# Patient Record
Sex: MALE | Race: WHITE | Employment: FULL TIME | URBAN - METROPOLITAN AREA
[De-identification: names, ages, dates, MRNs, and addresses within clinical notes are randomized per-mention and may not be internally consistent; named-entity substitution may affect disease eponyms.]

---

## 2017-06-09 PROCEDURE — 87340 HEPATITIS B SURFACE AG IA: CPT | Performed by: INTERNAL MEDICINE

## 2017-06-09 PROCEDURE — 86735 MUMPS ANTIBODY: CPT | Performed by: INTERNAL MEDICINE

## 2017-06-09 PROCEDURE — 86787 VARICELLA-ZOSTER ANTIBODY: CPT | Performed by: INTERNAL MEDICINE

## 2017-06-09 PROCEDURE — 86765 RUBEOLA ANTIBODY: CPT | Performed by: INTERNAL MEDICINE

## 2017-06-09 PROCEDURE — 86762 RUBELLA ANTIBODY: CPT | Performed by: INTERNAL MEDICINE

## 2018-09-04 ENCOUNTER — HOSPITAL ENCOUNTER (OUTPATIENT)
Age: 53
Discharge: HOME | End: 2018-09-04
Attending: FAMILY MEDICINE
Payer: COMMERCIAL

## 2018-09-04 VITALS
TEMPERATURE: 98 F | RESPIRATION RATE: 16 BRPM | DIASTOLIC BLOOD PRESSURE: 81 MMHG | OXYGEN SATURATION: 97 % | SYSTOLIC BLOOD PRESSURE: 122 MMHG | BODY MASS INDEX: 27 KG/M2 | WEIGHT: 200 LBS | HEART RATE: 70 BPM

## 2018-09-04 DIAGNOSIS — J32.9 SINOBRONCHITIS: Primary | ICD-10-CM

## 2018-09-04 DIAGNOSIS — J40 SINOBRONCHITIS: Primary | ICD-10-CM

## 2018-09-04 PROCEDURE — 99214 OFFICE O/P EST MOD 30 MIN: CPT

## 2018-09-04 PROCEDURE — 99213 OFFICE O/P EST LOW 20 MIN: CPT

## 2018-09-04 RX ORDER — BENZONATATE 200 MG/1
200 CAPSULE ORAL 3 TIMES DAILY PRN
Qty: 15 CAPSULE | Refills: 0 | Status: SHIPPED | OUTPATIENT
Start: 2018-09-04 | End: 2021-03-15

## 2018-09-04 RX ORDER — AMOXICILLIN AND CLAVULANATE POTASSIUM 875; 125 MG/1; MG/1
1 TABLET, FILM COATED ORAL 2 TIMES DAILY
Qty: 20 TABLET | Refills: 0 | Status: SHIPPED | OUTPATIENT
Start: 2018-09-04 | End: 2018-09-14

## 2018-09-04 RX ORDER — PREDNISONE 20 MG/1
40 TABLET ORAL DAILY
Qty: 10 TABLET | Refills: 0 | Status: SHIPPED | OUTPATIENT
Start: 2018-09-04 | End: 2018-09-09

## 2018-09-04 NOTE — ED PROVIDER NOTES
Patient presents with:  Sinus Problem  Cough/URI    HPI:     Annie Joseph is a 47year old male who presents with a chief complaint of sinus pain/pressure, facial pain, frontal headache, nasal congestion, thick colored nasal drainage, loss of smell, post edematous, inflamed, moderate maxillary sinus tenderness bilateral, mild frontal sinus tenderness bilateral  Throat: lips, mucosa, and tongue normal; teeth and gums normal  Neck: no adenopathy and supple, symmetrical, trachea midline  Back: symmetric, no c [id number]  [phone]    In 1 week  For re-check

## 2020-03-05 ENCOUNTER — HOSPITAL ENCOUNTER (OUTPATIENT)
Age: 55
Discharge: HOME | End: 2020-03-05
Payer: COMMERCIAL

## 2020-03-05 VITALS
HEART RATE: 75 BPM | SYSTOLIC BLOOD PRESSURE: 140 MMHG | DIASTOLIC BLOOD PRESSURE: 84 MMHG | RESPIRATION RATE: 16 BRPM | TEMPERATURE: 97 F | OXYGEN SATURATION: 96 %

## 2020-03-05 DIAGNOSIS — J06.9 VIRAL UPPER RESPIRATORY ILLNESS: Primary | ICD-10-CM

## 2020-03-05 LAB
POCT INFLUENZA A: NEGATIVE
POCT INFLUENZA B: NEGATIVE

## 2020-03-05 PROCEDURE — 87502 INFLUENZA DNA AMP PROBE: CPT

## 2020-03-05 PROCEDURE — 99213 OFFICE O/P EST LOW 20 MIN: CPT

## 2020-03-05 PROCEDURE — 99214 OFFICE O/P EST MOD 30 MIN: CPT

## 2020-03-05 RX ORDER — PREDNISONE 20 MG/1
40 TABLET ORAL DAILY
Qty: 10 TABLET | Refills: 0 | Status: SHIPPED | OUTPATIENT
Start: 2020-03-05 | End: 2020-03-10

## 2020-03-05 RX ORDER — ALBUTEROL SULFATE 90 UG/1
2 AEROSOL, METERED RESPIRATORY (INHALATION) EVERY 4 HOURS PRN
Qty: 1 INHALER | Refills: 0 | Status: SHIPPED | OUTPATIENT
Start: 2020-03-05 | End: 2020-04-04

## 2020-03-05 RX ORDER — CODEINE PHOSPHATE AND GUAIFENESIN 10; 100 MG/5ML; MG/5ML
5 SOLUTION ORAL EVERY 6 HOURS PRN
Qty: 118 ML | Refills: 0 | Status: SHIPPED | OUTPATIENT
Start: 2020-03-05 | End: 2021-03-15

## 2020-03-05 NOTE — ED PROVIDER NOTES
Patient Seen in: [address] [facility] [place]      History   Patient presents with:  Cough/URI    Stated Complaint: Cough, Faitgue, Eye Pain (Exposure to Influenza)    HPI    Patient is a 50-year-old male.   Within the past 48 hours he has developed in No injection noted to the bilateral auditory canals; no loss of landmarks. Normal nasal mucosa without audible nasal congestion. Oropharynx is patent without evidence of erythema, exudates or deviation.   No stridor to auscultation  Lung: No distress, RR,

## 2020-03-05 NOTE — ED INITIAL ASSESSMENT (HPI)
Pt with c/o cough that started on Tuesday. Pt with c/o fatigue, eye pain.   Pt was recently exposed to flu-a

## 2021-03-15 PROBLEM — J45.909 ASTHMA: Status: ACTIVE | Noted: 2021-03-15

## 2021-03-15 PROBLEM — J45.909 ASTHMA (HCC): Status: ACTIVE | Noted: 2021-03-15

## 2021-04-14 PROCEDURE — 88305 TISSUE EXAM BY PATHOLOGIST: CPT | Performed by: INTERNAL MEDICINE

## 2022-05-12 ENCOUNTER — HOSPITAL ENCOUNTER (OUTPATIENT)
Age: 57
Discharge: HOME | End: 2022-05-12
Payer: COMMERCIAL

## 2022-05-12 VITALS
RESPIRATION RATE: 18 BRPM | SYSTOLIC BLOOD PRESSURE: 122 MMHG | OXYGEN SATURATION: 96 % | HEART RATE: 80 BPM | DIASTOLIC BLOOD PRESSURE: 82 MMHG | TEMPERATURE: 98 F | HEIGHT: 72 IN | BODY MASS INDEX: 30.4 KG/M2 | WEIGHT: 224.44 LBS

## 2022-05-12 DIAGNOSIS — J02.0 STREP PHARYNGITIS: ICD-10-CM

## 2022-05-12 DIAGNOSIS — Z11.52 ENCOUNTER FOR SCREENING FOR COVID-19: Primary | ICD-10-CM

## 2022-05-12 DIAGNOSIS — U07.1 COVID-19 VIRUS INFECTION: ICD-10-CM

## 2022-05-12 DIAGNOSIS — R05.9 COUGH: ICD-10-CM

## 2022-05-12 LAB
POCT INFLUENZA A: NEGATIVE
POCT INFLUENZA B: NEGATIVE
S PYO AG THROAT QL: POSITIVE
SARS-COV-2 RNA RESP QL NAA+PROBE: DETECTED

## 2022-05-12 PROCEDURE — U0002 COVID-19 LAB TEST NON-CDC: HCPCS | Performed by: NURSE PRACTITIONER

## 2022-05-12 PROCEDURE — 87880 STREP A ASSAY W/OPTIC: CPT | Performed by: NURSE PRACTITIONER

## 2022-05-12 PROCEDURE — M0222 INTRAVENOUS INJECTION, BEBTELOVIMAB, INCLUDES INJECTION AND POST ADMINISTRATIVE MONITORING: HCPCS | Performed by: NURSE PRACTITIONER

## 2022-05-12 PROCEDURE — 99203 OFFICE O/P NEW LOW 30 MIN: CPT | Performed by: NURSE PRACTITIONER

## 2022-05-12 PROCEDURE — 87502 INFLUENZA DNA AMP PROBE: CPT | Performed by: NURSE PRACTITIONER

## 2022-05-12 RX ORDER — PENICILLIN V POTASSIUM 500 MG/1
500 TABLET ORAL 2 TIMES DAILY
Qty: 20 TABLET | Refills: 0 | Status: SHIPPED | OUTPATIENT
Start: 2022-05-12 | End: 2022-05-22

## 2022-05-12 RX ORDER — BEBTELOVIMAB 87.5 MG/ML
175 INJECTION, SOLUTION INTRAVENOUS ONCE
Status: COMPLETED | OUTPATIENT
Start: 2022-05-12 | End: 2022-05-12

## 2022-05-12 RX ORDER — BENZONATATE 100 MG/1
100 CAPSULE ORAL 3 TIMES DAILY PRN
Qty: 30 CAPSULE | Refills: 0 | Status: SHIPPED | OUTPATIENT
Start: 2022-05-12 | End: 2022-06-11

## 2022-05-12 RX ORDER — FLUTICASONE PROPIONATE 50 MCG
2 SPRAY, SUSPENSION (ML) NASAL DAILY
Qty: 16 G | Refills: 0 | Status: SHIPPED | OUTPATIENT
Start: 2022-05-12 | End: 2022-06-11

## 2023-12-19 ENCOUNTER — HOSPITAL ENCOUNTER (OUTPATIENT)
Age: 58
Discharge: HOME | End: 2023-12-19
Payer: COMMERCIAL

## 2023-12-19 VITALS
HEIGHT: 72 IN | WEIGHT: 225 LBS | HEART RATE: 80 BPM | BODY MASS INDEX: 30.48 KG/M2 | RESPIRATION RATE: 16 BRPM | SYSTOLIC BLOOD PRESSURE: 147 MMHG | OXYGEN SATURATION: 96 % | DIASTOLIC BLOOD PRESSURE: 92 MMHG | TEMPERATURE: 98 F

## 2023-12-19 DIAGNOSIS — J06.9 VIRAL UPPER RESPIRATORY ILLNESS: ICD-10-CM

## 2023-12-19 DIAGNOSIS — U07.1 COVID-19: Primary | ICD-10-CM

## 2023-12-19 LAB
POCT INFLUENZA A: NEGATIVE
POCT INFLUENZA B: NEGATIVE
SARS-COV-2 RNA RESP QL NAA+PROBE: DETECTED

## 2023-12-19 PROCEDURE — 99204 OFFICE O/P NEW MOD 45 MIN: CPT | Performed by: PHYSICIAN ASSISTANT

## 2023-12-19 PROCEDURE — U0002 COVID-19 LAB TEST NON-CDC: HCPCS | Performed by: PHYSICIAN ASSISTANT

## 2023-12-19 PROCEDURE — 87502 INFLUENZA DNA AMP PROBE: CPT | Performed by: PHYSICIAN ASSISTANT

## 2023-12-19 RX ORDER — METHYLPREDNISOLONE 4 MG/1
TABLET ORAL
Qty: 1 EACH | Refills: 0 | Status: SHIPPED | OUTPATIENT
Start: 2023-12-19

## 2023-12-19 NOTE — DISCHARGE INSTRUCTIONS
Continue to quarantine for an additional few days. Purchase Sudafed directly from the pharmacist and take as directed. Flonase.   Vitamin C and zinc.  Push clear fluids

## 2024-03-21 ENCOUNTER — APPOINTMENT (OUTPATIENT)
Dept: GENERAL RADIOLOGY | Age: 59
End: 2024-03-21
Attending: NURSE PRACTITIONER
Payer: COMMERCIAL

## 2024-03-21 ENCOUNTER — HOSPITAL ENCOUNTER (OUTPATIENT)
Age: 59
Discharge: HOME | End: 2024-03-21
Payer: COMMERCIAL

## 2024-03-21 VITALS
RESPIRATION RATE: 18 BRPM | TEMPERATURE: 98 F | DIASTOLIC BLOOD PRESSURE: 75 MMHG | SYSTOLIC BLOOD PRESSURE: 134 MMHG | HEART RATE: 79 BPM | OXYGEN SATURATION: 97 %

## 2024-03-21 DIAGNOSIS — J34.89 STUFFY AND RUNNY NOSE: ICD-10-CM

## 2024-03-21 DIAGNOSIS — J34.89 SINUS PRESSURE: ICD-10-CM

## 2024-03-21 DIAGNOSIS — J02.9 SORE THROAT: ICD-10-CM

## 2024-03-21 DIAGNOSIS — R05.1 ACUTE COUGH: Primary | ICD-10-CM

## 2024-03-21 LAB — S PYO AG THROAT QL: NEGATIVE

## 2024-03-21 PROCEDURE — 71046 X-RAY EXAM CHEST 2 VIEWS: CPT | Performed by: NURSE PRACTITIONER

## 2024-03-21 RX ORDER — PREDNISONE 20 MG/1
40 TABLET ORAL DAILY
Qty: 10 TABLET | Refills: 0 | Status: SHIPPED | OUTPATIENT
Start: 2024-03-21 | End: 2024-03-26

## 2024-03-21 NOTE — DISCHARGE INSTRUCTIONS
Humidifier in same room.  Hot steam showers/steam inhalations.  Sinus rinses such as the Mariel pot.  Continue taking your Claritin and Flonase.  Keep well-hydrated.  Over-the-counter Motrin and/or Tylenol as needed.  Take the prednisone as prescribed.  Follow-up with your doctor.  Return or go to the ER for new or worsening symptoms.

## 2024-03-21 NOTE — ED INITIAL ASSESSMENT (HPI)
Patient c/o facial pain/pressure, sore throat and cough for 4 days. Was + for Covid in December.

## 2024-03-21 NOTE — ED PROVIDER NOTES
Patient Seen in: [facility]      History     Chief Complaint   Patient presents with    Cough    Sinusitis    Sore Throat     Stated Complaint: sinus issues, sore throat, cough    Subjective:   58-year-old male, with 3 days of stuffy and runny nose, sinus pressure and pain, sore throat, cough.  Concerned because he thinks that the cough is getting worse.  Has a wife who who is sick with similar symptoms.  States that his wife was given a Z-Patircio.  Patient does take Flonase and an antihistamine.  Also takes albuterol inhaler as needed.  History of extrinsic asthma.            Objective:   Past Medical History:   Diagnosis Date    Extrinsic asthma, unspecified     asthma flares with illnesses, not typically on inhaler              Past Surgical History:   Procedure Laterality Date    HIP REPLACEMENT SURGERY                  Social History     Socioeconomic History    Marital status:    Tobacco Use    Smoking status: Former     Packs/day: 0.50     Years: 20.00     Additional pack years: 0.00     Total pack years: 10.00     Types: Cigarettes     Quit date: 2016     Years since quittin.2    Smokeless tobacco: Never   Vaping Use    Vaping Use: Some days   Substance and Sexual Activity    Alcohol use: Yes     Alcohol/week: 0.0 standard drinks of alcohol     Comment: Former alcoholic    Drug use: Yes     Types: Cannabis     Comment: occ              Review of Systems   Constitutional: Negative.    HENT:  Positive for congestion, rhinorrhea, sinus pressure and sore throat.    Respiratory:  Positive for cough.    All other systems reviewed and are negative.      Positive for stated complaint: sinus issues, sore throat, cough  Other systems are as noted in HPI.  Constitutional and vital signs reviewed.      All other systems reviewed and negative except as noted above.    Physical Exam     ED Triage Vitals [24 1718]   /75   Pulse 79   Resp 18   Temp 98 °F (36.7 °C)   Temp src Temporal    SpO2 97 %   O2 Device None (Room air)       Current:/75   Pulse 79   Temp 98 °F (36.7 °C) (Temporal)   Resp 18   SpO2 97%         Physical Exam  Vitals and nursing note reviewed.   Constitutional:       General: He is not in acute distress.     Appearance: He is well-developed. He is not ill-appearing, toxic-appearing or diaphoretic.   HENT:      Head:      Jaw: No trismus.      Right Ear: Tympanic membrane, ear canal and external ear normal.      Left Ear: Tympanic membrane, ear canal and external ear normal.      Nose: Congestion present.      Right Sinus: No maxillary sinus tenderness or frontal sinus tenderness.      Left Sinus: No maxillary sinus tenderness or frontal sinus tenderness.      Mouth/Throat:      Lips: Pink. No lesions.      Mouth: Mucous membranes are moist. No oral lesions.      Tongue: No lesions.      Palate: No lesions.      Pharynx: Oropharynx is clear. Uvula midline. No pharyngeal swelling, oropharyngeal exudate, posterior oropharyngeal erythema or uvula swelling.      Tonsils: No tonsillar exudate or tonsillar abscesses.   Cardiovascular:      Rate and Rhythm: Normal rate and regular rhythm.      Pulses: Normal pulses.      Heart sounds: Normal heart sounds.   Pulmonary:      Effort: Pulmonary effort is normal. No respiratory distress.      Breath sounds: Normal breath sounds. No stridor. No wheezing, rhonchi or rales.   Musculoskeletal:      Cervical back: Normal range of motion and neck supple.   Skin:     General: Skin is warm and dry.      Coloration: Skin is not pale.      Findings: No rash.   Neurological:      General: No focal deficit present.      Mental Status: He is alert.   Psychiatric:         Mood and Affect: Mood normal.               ED Course     Labs Reviewed   POCT RAPID STREP - Normal     XR CHEST PA + LAT CHEST (CPT=71046)    Result Date: 3/21/2024  PROCEDURE:  XR CHEST PA + LAT CHEST (CPT=71046)  INDICATIONS:  sinus issues, sore throat, cough  COMPARISON:   None.  TECHNIQUE:  PA and lateral chest radiographs were obtained.  PATIENT STATED HISTORY: (As transcribed by Technologist)  The patient has a cough and congestion.    FINDINGS:  Mild bronchial wall thickening.  No lobar consolidation is seen.  Cardiac silhouette is normal in size.  Pleural spaces are clear.            CONCLUSION:  Bronchial wall thickening may represent bronchitis or reactive airway disease.  LOCATION:  [facility]   Dictated by (CST): Stromberg, LeRoy, MD on 3/21/2024 at 5:48 PM     Finalized by (CST): Stromberg, LeRoy, MD on 3/21/2024 at 5:49 PM                       MDM                                         Medical Decision Making  Differential diagnosis initially included but was not limited to: Strep, pneumonia, other viral illness    Nontoxic 58-year-old male, with 3 to 4 days of sinus congestion, stuffy and runny nose, sore throat, cough.  States sore throat is mainly from coughing.  States he has a wife who is sick with similar symptoms, and his wife was started on Z-Patricio.  Given the duration of the symptoms, highly unlikely that this is a bacterial sinus infection.  More likely to be a viral.  However he was concerned with worsening cough, so I will obtain a x-ray of the chest to rule out pneumonia.  I will also obtain strep to rule out strep infection.There is no trismus, drooling, unilateral tonsillar tonsillar swelling, voice change/muffled voice, so I do not think this is epiglottitis/peritonsillar abscess.  There is no adventitious breath sounds.  No signs of increased work of breathing or respiratory distress.  Negative strep.I personally viewed, independently interpreted and radiology report was reviewed.  My personal interpretation is no pneumonia.  Likely viral.    Supportive/home management of diagnosis/illness/injury discussed. Red flag symptoms discussed.  Signs and symptoms/criteria that would necessitate reevaluation, including ER evaluation discussed.  Patient and/or  responsible adult verbalize and agree with management and plan of care.    Speech recognition software was used during this dictation.  There may be minor errors in transcription.      Amount and/or Complexity of Data Reviewed  Labs: ordered. Decision-making details documented in ED Course.  Radiology: ordered and independent interpretation performed. Decision-making details documented in ED Course.    Risk  OTC drugs.  Prescription drug management.        Disposition and Plan     Clinical Impression:  1. Acute cough    2. Stuffy and runny nose    3. Sinus pressure    4. Sore throat         Disposition:  Discharge  3/21/2024  5:54 pm    Follow-up:  Dm Schwartz MD  [address] [address]  [phone]    Call in 5 days            Medications Prescribed:  Current Discharge Medication List        START taking these medications    Details   predniSONE 20 MG Oral Tab Take 2 tablets (40 mg total) by mouth daily for 5 days.  Qty: 10 tablet, Refills: 0

## 2024-11-02 ENCOUNTER — APPOINTMENT (OUTPATIENT)
Dept: GENERAL RADIOLOGY | Age: 59
End: 2024-11-02
Attending: NURSE PRACTITIONER
Payer: COMMERCIAL

## 2024-11-02 ENCOUNTER — HOSPITAL ENCOUNTER (OUTPATIENT)
Age: 59
Discharge: HOME | End: 2024-11-02
Payer: COMMERCIAL

## 2024-11-02 VITALS
OXYGEN SATURATION: 97 % | TEMPERATURE: 98 F | DIASTOLIC BLOOD PRESSURE: 76 MMHG | HEART RATE: 86 BPM | SYSTOLIC BLOOD PRESSURE: 127 MMHG | RESPIRATION RATE: 20 BRPM

## 2024-11-02 DIAGNOSIS — J40 SINOBRONCHITIS: Primary | ICD-10-CM

## 2024-11-02 DIAGNOSIS — J32.9 SINOBRONCHITIS: Primary | ICD-10-CM

## 2024-11-02 PROCEDURE — 99214 OFFICE O/P EST MOD 30 MIN: CPT | Performed by: NURSE PRACTITIONER

## 2024-11-02 PROCEDURE — 71101 X-RAY EXAM UNILAT RIBS/CHEST: CPT | Performed by: NURSE PRACTITIONER

## 2024-11-02 RX ORDER — PREDNISONE 20 MG/1
40 TABLET ORAL DAILY
Qty: 10 TABLET | Refills: 0 | Status: SHIPPED | OUTPATIENT
Start: 2024-11-02 | End: 2024-11-07

## 2024-11-02 RX ORDER — ALBUTEROL SULFATE 90 UG/1
2 INHALANT RESPIRATORY (INHALATION) EVERY 4 HOURS PRN
Qty: 1 EACH | Refills: 0 | Status: SHIPPED | OUTPATIENT
Start: 2024-11-02 | End: 2024-12-02

## 2024-11-02 RX ORDER — TESTOSTERONE CYPIONATE 200 MG/ML
INJECTION, SOLUTION INTRAMUSCULAR
COMMUNITY
Start: 2024-11-01

## 2024-11-02 NOTE — ED PROVIDER NOTES
Patient Seen in: [facility]      History     Chief Complaint   Patient presents with    Cough/URI     Stated Complaint: Cough    Subjective:   58 yo male presents to the immediate care with c/o cough.  Patient states he has had a cough, nasal congestion, and sinus pressure for 7-8 days.  He has been taking Claritin daily, and using his wife's albuterol inhaler for his cough.  He states that the albuterol does help and he has only been using 2 puffs once or twice a day.  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  The cough makes the rib pain a lot worse.  He denies any fever, chills, ear pain, ear drainage, sore throat, shortness of breath, nausea, or vomiting.     The history is provided by the patient.         Objective:     Past Medical History:    Extrinsic asthma, unspecified    asthma flares with illnesses, not typically on inhaler              Past Surgical History:   Procedure Laterality Date    Hip replacement surgery                  Social History     Socioeconomic History    Marital status:    Tobacco Use    Smoking status: Former     Current packs/day: 0.00     Average packs/day: 0.5 packs/day for 20.0 years (10.0 ttl pk-yrs)     Types: Cigarettes     Start date: 1996     Quit date: 2016     Years since quittin.8    Smokeless tobacco: Never   Vaping Use    Vaping status: Some Days   Substance and Sexual Activity    Alcohol use: Yes     Alcohol/week: 0.0 standard drinks of alcohol     Comment: Former alcoholic    Drug use: Yes     Types: Cannabis     Comment: occ              Review of Systems   Constitutional: Negative.  Negative for chills and fever.   HENT:  Positive for congestion, postnasal drip, sinus pressure and sinus pain. Negative for ear discharge, ear pain, sore throat, trouble swallowing and voice change.    Respiratory:  Positive for cough and chest tightness. Negative for shortness of breath and wheezing.     Cardiovascular:  Positive for chest pain. Negative for palpitations and leg swelling.   All other systems reviewed and are negative.      Positive for stated complaint: Cough  Other systems are as noted in HPI.  Constitutional and vital signs reviewed.      All other systems reviewed and negative except as noted above.    Physical Exam     ED Triage Vitals [11/02/24 0817]   /76   Pulse 86   Resp 20   Temp 98.2 °F (36.8 °C)   Temp src Temporal   SpO2 97 %   O2 Device None (Room air)       Current Vitals:   Vital Signs  BP: 127/76  Pulse: 86  Resp: 20  Temp: 98.2 °F (36.8 °C)  Temp src: Temporal    Oxygen Therapy  SpO2: 97 %  O2 Device: None (Room air)        Physical Exam  Vitals and nursing note reviewed.   Constitutional:       General: He is not in acute distress.     Appearance: Normal appearance. He is normal weight.   HENT:      Head: Normocephalic and atraumatic.      Right Ear: Tympanic membrane, ear canal and external ear normal.      Left Ear: Tympanic membrane, ear canal and external ear normal.      Nose: Congestion present.      Mouth/Throat:      Mouth: Mucous membranes are moist.      Pharynx: Oropharynx is clear. Posterior oropharyngeal erythema present.      Comments: Cobblestoning and postnasal drainage.   Eyes:      Conjunctiva/sclera: Conjunctivae normal.      Pupils: Pupils are equal, round, and reactive to light.   Cardiovascular:      Rate and Rhythm: Normal rate and regular rhythm.      Pulses: Normal pulses.      Heart sounds: Normal heart sounds.   Pulmonary:      Effort: Pulmonary effort is normal. No respiratory distress.      Breath sounds: Normal breath sounds.   Chest:      Chest wall: Tenderness present.      Comments: Mild tenderness with palpation of right sternal ribs.  No obvious deformity, ecchymosis, crepitus, or subcutaneous emphysema.  Musculoskeletal:         General: Normal range of motion.   Skin:     General: Skin is warm and dry.      Capillary Refill: Capillary  refill takes 2 to 3 seconds.   Neurological:      General: No focal deficit present.      Mental Status: He is alert and oriented to person, place, and time.   Psychiatric:         Mood and Affect: Mood normal.         Behavior: Behavior normal.           ED Course   Labs Reviewed - No data to display    ED Course as of 11/02/24 0908  ------------------------------------------------------------  Time: 11/02 0853  Value: XR RIBS WITH CHEST (3 VIEWS), RIGHT  (CPT=71101)  Comment: Impression  CONCLUSION:  No rib fracture, or other rib abnormality identified.            [facility]          Medical Decision Making  59-year-old male with rib pain, cough, and sinus pressure for over a week.  Chest x-ray was ordered due to fall and rib pain.    Chest x-ray as read by radiology shows no acute rib fractures or other cardiopulmonary pathology.  History and exam consistent with a sinobronchitis.  Will treat patient with p.o. antibiotics, short burst of steroids, and his own albuterol inhaler.  Continue supportive management at home.  No evidence of sepsis, otitis media, otitis externa, strep, pneumonia, rib fracture, or other cardiopulmonary pathology.  Do not feel that any other imaging or labs are necessary at this time.  Patient to follow-up with his own PCP in 1 week.    Amount and/or Complexity of Data Reviewed  Radiology: ordered. Decision-making details documented in ED Course.    Risk  OTC drugs.  Prescription drug management.        Disposition and Plan     Clinical Impression:  1. Sinobronchitis         Disposition:  Discharge  11/2/2024  8:58 am    Follow-up:  Dm Schwartz MD  [address] [address]  [phone]    In 1 week  As needed          Medications Prescribed:  Current Discharge Medication List        START taking these medications    Details   predniSONE 20 MG Oral Tab Take 2 tablets (40 mg total) by mouth daily for 5 days.  Qty: 10 tablet, Refills: 0      amoxicillin  clavulanate 875-125 MG Oral Tab Take 1 tablet by mouth 2 (two) times daily for 10 days.  Qty: 20 tablet, Refills: 0      albuterol 108 (90 Base) MCG/ACT Inhalation Aero Soln Inhale 2 puffs into the lungs every 4 (four) hours as needed for Wheezing.  Qty: 1 each, Refills: 0                 Supplementary Documentation:

## 2024-11-02 NOTE — ED INITIAL ASSESSMENT (HPI)
Pt with c/o congested productive cough and nasal congestion x 1 week.   Pt also fell while deer hunting and has rib pain on the right side which is worse with coughing     Has been using his wife's albuterol inhaler for cough

## 2024-11-02 NOTE — DISCHARGE INSTRUCTIONS
Rest and drink plenty of fluids.   Start the antibiotics and make sure to finish the entire prescription.   Use Zyrtec, Claritin, or Allegra twice a day to help dry up the sinuses.  Get Sudafed from behind the pharmacy counter to help with sinus pressure and headaches.   Take Tylenol and/or ibuprofen as needed for pain.   Start the steroids and make sure to take these with food as it can upset your stomach.  Use the albuterol inhaler 2 to 4 puffs with a spacer every 4 hours as needed to help with cough.  You can use up to 8 puffs in 4 hours if needed.  Try warm, moist compresses to also help with pressure.   Follow up with your PCP in 7 days if not improved.